# Patient Record
Sex: FEMALE | Race: BLACK OR AFRICAN AMERICAN | URBAN - METROPOLITAN AREA
[De-identification: names, ages, dates, MRNs, and addresses within clinical notes are randomized per-mention and may not be internally consistent; named-entity substitution may affect disease eponyms.]

---

## 2018-03-13 PROBLEM — R09.2 RESPIRATORY ARREST (HCC): Status: ACTIVE | Noted: 2018-03-13

## 2018-04-04 ENCOUNTER — SURG/PROC ORDERS (OUTPATIENT)
Dept: ANESTHESIOLOGY | Age: 53
End: 2018-04-04

## 2018-04-04 RX ORDER — SODIUM CHLORIDE 9 MG/ML
INJECTION, SOLUTION INTRAVENOUS CONTINUOUS
Status: CANCELLED | OUTPATIENT
Start: 2018-04-04

## 2018-04-04 RX ORDER — SODIUM CHLORIDE 0.9 % (FLUSH) 0.9 %
10 SYRINGE (ML) INJECTION PRN
Status: CANCELLED | OUTPATIENT
Start: 2018-04-04

## 2018-04-04 RX ORDER — SODIUM CHLORIDE 0.9 % (FLUSH) 0.9 %
10 SYRINGE (ML) INJECTION EVERY 12 HOURS SCHEDULED
Status: CANCELLED | OUTPATIENT
Start: 2018-04-04

## 2018-04-05 ENCOUNTER — SURG/PROC ORDERS (OUTPATIENT)
Dept: ANESTHESIOLOGY | Age: 53
End: 2018-04-05

## 2018-04-05 ENCOUNTER — PAT TELEPHONE (OUTPATIENT)
Dept: PREADMISSION TESTING | Age: 53
End: 2018-04-05

## 2018-04-05 RX ORDER — ATORVASTATIN CALCIUM 80 MG/1
80 TABLET, FILM COATED ORAL EVERY EVENING
COMMUNITY

## 2018-04-05 RX ORDER — CARVEDILOL 3.12 MG/1
25 TABLET ORAL 2 TIMES DAILY WITH MEALS
Status: ON HOLD | COMMUNITY
End: 2018-06-18 | Stop reason: SDUPTHER

## 2018-04-05 RX ORDER — SODIUM CHLORIDE 0.9 % (FLUSH) 0.9 %
10 SYRINGE (ML) INJECTION PRN
Status: CANCELLED | OUTPATIENT
Start: 2018-04-05

## 2018-04-05 RX ORDER — SODIUM CHLORIDE 9 MG/ML
INJECTION, SOLUTION INTRAVENOUS CONTINUOUS
Status: CANCELLED | OUTPATIENT
Start: 2018-04-05

## 2018-04-05 RX ORDER — MIDODRINE HYDROCHLORIDE 5 MG/1
10 TABLET ORAL 2 TIMES DAILY PRN
COMMUNITY

## 2018-04-05 RX ORDER — FAMOTIDINE 20 MG/1
20 TABLET, FILM COATED ORAL DAILY
COMMUNITY

## 2018-04-05 RX ORDER — HYDRALAZINE HYDROCHLORIDE 25 MG/1
25 TABLET, FILM COATED ORAL EVERY 6 HOURS PRN
COMMUNITY

## 2018-04-05 RX ORDER — IPRATROPIUM BROMIDE 21 UG/1
2 SPRAY, METERED NASAL EVERY 12 HOURS
Status: ON HOLD | COMMUNITY
End: 2018-04-07 | Stop reason: CLARIF

## 2018-04-05 RX ORDER — SODIUM CHLORIDE 0.9 % (FLUSH) 0.9 %
10 SYRINGE (ML) INJECTION EVERY 12 HOURS SCHEDULED
Status: CANCELLED | OUTPATIENT
Start: 2018-04-05

## 2018-04-05 RX ORDER — NICOTINE POLACRILEX 4 MG
15 LOZENGE BUCCAL PRN
Status: ON HOLD | COMMUNITY
End: 2018-06-19 | Stop reason: HOSPADM

## 2018-04-05 RX ORDER — LEVOTHYROXINE SODIUM 0.05 MG/1
50 TABLET ORAL DAILY
COMMUNITY

## 2018-04-05 RX ORDER — LORAZEPAM 1 MG/1
1 TABLET ORAL EVERY 6 HOURS PRN
Status: ON HOLD | COMMUNITY
End: 2018-04-07 | Stop reason: CLARIF

## 2018-04-06 PROBLEM — J96.10 CHRONIC RESPIRATORY FAILURE (HCC): Status: ACTIVE | Noted: 2018-04-06

## 2018-04-07 PROBLEM — D62 ACUTE BLOOD LOSS ANEMIA: Status: ACTIVE | Noted: 2018-04-07

## 2018-04-07 PROBLEM — N18.2 CKD (CHRONIC KIDNEY DISEASE), STAGE II: Status: ACTIVE | Noted: 2018-04-07

## 2018-04-07 PROBLEM — Z79.4 TYPE 2 DIABETES MELLITUS WITH STAGE 2 CHRONIC KIDNEY DISEASE, WITH LONG-TERM CURRENT USE OF INSULIN (HCC): Status: ACTIVE | Noted: 2018-04-07

## 2018-04-07 PROBLEM — I82.403 ACUTE DEEP VEIN THROMBOSIS (DVT) OF BOTH LOWER EXTREMITIES (HCC): Status: ACTIVE | Noted: 2018-04-07

## 2018-04-07 PROBLEM — N18.2 TYPE 2 DIABETES MELLITUS WITH STAGE 2 CHRONIC KIDNEY DISEASE, WITH LONG-TERM CURRENT USE OF INSULIN (HCC): Status: ACTIVE | Noted: 2018-04-07

## 2018-04-07 PROBLEM — E11.22 TYPE 2 DIABETES MELLITUS WITH STAGE 2 CHRONIC KIDNEY DISEASE, WITH LONG-TERM CURRENT USE OF INSULIN (HCC): Status: ACTIVE | Noted: 2018-04-07

## 2018-06-17 PROBLEM — J96.00 RESPIRATORY FAILURE, ACUTE (HCC): Status: ACTIVE | Noted: 2018-06-17

## 2018-06-19 PROBLEM — J96.00 ACUTE RESPIRATORY FAILURE (HCC): Status: ACTIVE | Noted: 2018-06-19

## 2018-08-26 PROBLEM — I82.612 LEFT CEPHALIC VEIN THROMBOSIS: Status: ACTIVE | Noted: 2018-08-26

## 2018-09-03 PROBLEM — I82.612 LEFT CEPHALIC VEIN THROMBOSIS: Chronic | Status: ACTIVE | Noted: 2018-08-26

## 2018-09-03 PROBLEM — E44.0 MODERATE PROTEIN-CALORIE MALNUTRITION (HCC): Status: ACTIVE | Noted: 2018-09-03

## 2018-09-03 PROBLEM — G93.1 ANOXIC BRAIN INJURY (HCC): Status: ACTIVE | Noted: 2018-09-03

## 2018-09-03 PROBLEM — N18.2 TYPE 2 DIABETES MELLITUS WITH STAGE 2 CHRONIC KIDNEY DISEASE, WITH LONG-TERM CURRENT USE OF INSULIN (HCC): Chronic | Status: ACTIVE | Noted: 2018-04-07

## 2018-09-03 PROBLEM — J15.6 PROTEUS MIRABILIS PNEUMONIA (HCC): Status: ACTIVE | Noted: 2018-09-03

## 2018-09-03 PROBLEM — E11.22 TYPE 2 DIABETES MELLITUS WITH STAGE 2 CHRONIC KIDNEY DISEASE, WITH LONG-TERM CURRENT USE OF INSULIN (HCC): Chronic | Status: ACTIVE | Noted: 2018-04-07

## 2018-09-03 PROBLEM — R53.2 FUNCTIONAL QUADRIPLEGIA (HCC): Status: ACTIVE | Noted: 2018-09-03

## 2018-09-03 PROBLEM — Z79.4 TYPE 2 DIABETES MELLITUS WITH STAGE 2 CHRONIC KIDNEY DISEASE, WITH LONG-TERM CURRENT USE OF INSULIN (HCC): Chronic | Status: ACTIVE | Noted: 2018-04-07

## 2018-09-03 PROBLEM — D62 ACUTE BLOOD LOSS ANEMIA: Status: RESOLVED | Noted: 2018-04-07 | Resolved: 2018-09-03

## 2018-09-03 PROBLEM — G93.1 ANOXIC BRAIN INJURY (HCC): Chronic | Status: ACTIVE | Noted: 2018-09-03

## 2018-09-22 ENCOUNTER — HOSPITAL ENCOUNTER (EMERGENCY)
Age: 53
Discharge: NURSING, REHAB OR LONG-TERM CARE | End: 2018-09-22
Payer: COMMERCIAL

## 2018-09-22 DIAGNOSIS — J95.09 TRACHEOSTOMY OBSTRUCTION (HCC): Primary | ICD-10-CM

## 2018-09-22 DIAGNOSIS — R79.89 AZOTEMIA: ICD-10-CM

## 2018-09-22 LAB
A/G RATIO: 0.9 (ref 1.1–2.2)
ALBUMIN SERPL-MCNC: 3.7 G/DL (ref 3.4–5)
ALP BLD-CCNC: 72 U/L (ref 40–129)
ALT SERPL-CCNC: 16 U/L (ref 10–40)
ANION GAP SERPL CALCULATED.3IONS-SCNC: 5 MMOL/L (ref 3–16)
AST SERPL-CCNC: 13 U/L (ref 15–37)
BACTERIA: ABNORMAL /HPF
BASOPHILS ABSOLUTE: 0 K/UL (ref 0–0.2)
BASOPHILS RELATIVE PERCENT: 0.4 %
BILIRUB SERPL-MCNC: 0.3 MG/DL (ref 0–1)
BILIRUBIN URINE: NEGATIVE
BLOOD, URINE: ABNORMAL
BUN BLDV-MCNC: 122 MG/DL (ref 7–20)
CALCIUM SERPL-MCNC: 10.6 MG/DL (ref 8.3–10.6)
CHLORIDE BLD-SCNC: 106 MMOL/L (ref 99–110)
CLARITY: ABNORMAL
CO2: 31 MMOL/L (ref 21–32)
COLOR: YELLOW
COMMENT UA: ABNORMAL
CREAT SERPL-MCNC: 3.4 MG/DL (ref 0.6–1.1)
EOSINOPHILS ABSOLUTE: 0.5 K/UL (ref 0–0.6)
EOSINOPHILS RELATIVE PERCENT: 5.3 %
EPITHELIAL CELLS, UA: ABNORMAL /HPF
GFR AFRICAN AMERICAN: 17
GFR NON-AFRICAN AMERICAN: 14
GLOBULIN: 4.1 G/DL
GLUCOSE BLD-MCNC: 162 MG/DL (ref 70–99)
GLUCOSE URINE: NEGATIVE MG/DL
HCT VFR BLD CALC: 30.9 % (ref 36–48)
HEMOGLOBIN: 9.6 G/DL (ref 12–16)
KETONES, URINE: NEGATIVE MG/DL
LEUKOCYTE ESTERASE, URINE: ABNORMAL
LYMPHOCYTES ABSOLUTE: 1.5 K/UL (ref 1–5.1)
LYMPHOCYTES RELATIVE PERCENT: 17.1 %
MCH RBC QN AUTO: 26.4 PG (ref 26–34)
MCHC RBC AUTO-ENTMCNC: 30.9 G/DL (ref 31–36)
MCV RBC AUTO: 85.3 FL (ref 80–100)
MICROSCOPIC EXAMINATION: YES
MONOCYTES ABSOLUTE: 0.5 K/UL (ref 0–1.3)
MONOCYTES RELATIVE PERCENT: 5.3 %
NEUTROPHILS ABSOLUTE: 6.4 K/UL (ref 1.7–7.7)
NEUTROPHILS RELATIVE PERCENT: 71.9 %
NITRITE, URINE: POSITIVE
PDW BLD-RTO: 16.3 % (ref 12.4–15.4)
PH UA: 6
PLATELET # BLD: 312 K/UL (ref 135–450)
PMV BLD AUTO: 9.7 FL (ref 5–10.5)
POTASSIUM SERPL-SCNC: 4.8 MMOL/L (ref 3.5–5.1)
PRO-BNP: 355 PG/ML (ref 0–124)
PROTEIN UA: 100 MG/DL
RBC # BLD: 3.63 M/UL (ref 4–5.2)
RBC UA: ABNORMAL /HPF (ref 0–2)
SODIUM BLD-SCNC: 142 MMOL/L (ref 136–145)
SPECIFIC GRAVITY UA: 1.02
TOTAL PROTEIN: 7.8 G/DL (ref 6.4–8.2)
TROPONIN: 0.23 NG/ML
URINE REFLEX TO CULTURE: YES
URINE TYPE: ABNORMAL
UROBILINOGEN, URINE: 0.2 E.U./DL
WBC # BLD: 8.9 K/UL (ref 4–11)
WBC UA: ABNORMAL /HPF (ref 0–5)

## 2018-09-22 PROCEDURE — 87077 CULTURE AEROBIC IDENTIFY: CPT

## 2018-09-22 PROCEDURE — 80053 COMPREHEN METABOLIC PANEL: CPT

## 2018-09-22 PROCEDURE — 87040 BLOOD CULTURE FOR BACTERIA: CPT

## 2018-09-22 PROCEDURE — 83880 ASSAY OF NATRIURETIC PEPTIDE: CPT

## 2018-09-22 PROCEDURE — 87186 SC STD MICRODIL/AGAR DIL: CPT

## 2018-09-22 PROCEDURE — 87184 SC STD DISK METHOD PER PLATE: CPT

## 2018-09-22 PROCEDURE — 87086 URINE CULTURE/COLONY COUNT: CPT

## 2018-09-22 PROCEDURE — 36415 COLL VENOUS BLD VENIPUNCTURE: CPT

## 2018-09-22 PROCEDURE — 99284 EMERGENCY DEPT VISIT MOD MDM: CPT

## 2018-09-22 PROCEDURE — 84484 ASSAY OF TROPONIN QUANT: CPT

## 2018-09-22 PROCEDURE — 81001 URINALYSIS AUTO W/SCOPE: CPT

## 2018-09-22 PROCEDURE — 85025 COMPLETE CBC W/AUTO DIFF WBC: CPT

## 2018-09-25 ENCOUNTER — HOSPITAL ENCOUNTER (OUTPATIENT)
Dept: GENERAL RADIOLOGY | Age: 53
Discharge: HOME | End: 2018-09-25
Payer: COMMERCIAL

## 2018-09-25 DIAGNOSIS — J18.9 PNEUMONIA DUE TO INFECTIOUS ORGANISM, UNSPECIFIED LATERALITY, UNSPECIFIED PART OF LUNG: ICD-10-CM

## 2018-09-25 LAB
ORGANISM: ABNORMAL
URINE CULTURE, ROUTINE: ABNORMAL
URINE CULTURE, ROUTINE: ABNORMAL

## 2018-09-25 PROCEDURE — 71045 X-RAY EXAM CHEST 1 VIEW: CPT

## 2018-09-26 NOTE — ED PROVIDER NOTES
Triage Chief Complaint:   No chief complaint on file. Inaja:  Ana Rivera is a 47 y.o. female that presents to the emergency department after being brought in by EMS. Patient was picked up from a local nursing home, where they deal with trach dependent patients. The patient was being sent in secondary to fact that the patient had a blocked PEG tube. EMS, however, when they arrived on scene and found the patient being restricted distress reported this as respiratory distress. The patient is unable to give any accurate history, but seems to be having difficulty breathing, and her tubes seems be actively clogged. Patient had no other history provided, and nursing notes were sparse. ROS:  At least 10 systems reviewed and otherwise acutely negative except as in the [address] Past Medical History:   Diagnosis Date    Arthritis     Asthma     [facility]est[facility]     Chiari malformation     Chronic kidney disease, stage 3     COPD (chronic obstructive pulmonary disease) ([facility]     CVA (cerebral vascular accident) ([facility] [address] 06/22/2017    Diabetes mellitus ([facility] [address]     ESBL (extended spectrum beta-lactamase) producing bacteria infection 09/22/2018    urine    Hyperlipidemia     Hypertension     MDRO (multiple drug resistant organisms) resistance 8/26/18;06/19/2018    sputum    Sleep apnea     Thyroid disease      Past Surgical History:   Procedure Laterality Date    KNEE SURGERY      KNEE SURGERY       No family history on file. Social History     Social History    Marital status: Single     Spouse name: N/A    Number of children: N/A    Years of education: N/A     Occupational History    Not on file.      Social History Main Topics    Smoking status: Never Smoker    Smokeless tobacco: Never Used    Alcohol use Not on file    Drug use: No    Sexual activity: Not on file     Other Topics Concern    Not on file     Social History Narrative    No narrative on file     No current Other Shortness Of Breath     Asthma attacks    Peanut (Diagnostic)      HIVES, SOB  Asthma attacks  HIVES, SOB    Peanut Butter Flavor Shortness Of Breath    Peanut-Containing Drug Products Shortness Of Breath and Other (See Comments)     Asthma attacks    Propoxyphene Nausea And Vomiting and Shortness Of Breath     Pt.vomited, pt experienced chest tightness, coughing, sob & wheezing after given CT contrast on 7/7/13  Pt. vomited, pt experienced chest tightness, coughing, sob & wheezing after given CT contrast on 7/7/13  Pt. vomited, pt experienced chest tightness, coughing, sob & wheezing after given CT contrast on 7/7/13    Ketorolac Tromethamine Itching    Tramadol Hives and Itching    Ketorolac Rash    Ketorolac Tromethamine Itching and Nausea And Vomiting       Nursing Notes Reviewed    Physical Exam:  ED Triage Vitals   BP Temp Temp src Pulse Resp SpO2 Height Weight   -- -- -- -- -- -- -- --     GENERAL APPEARANCE: Awake, In moderate distress. HEAD: Normocephalic. Atraumatic. EYES: EOM's grossly intact. Sclera anicteric. ENT: Mucous membranes are moist. Tolerates saliva. No trismus. NECK: Supple. No meningismus. Trachea midline. Trach site appears clean, however trach itself does appear to be obstructed. HEART: Tachycardic. LUNGS: Respirations unlabored. Rhonchi bilaterally. ABDOMEN: Soft. Non-tender. No guarding or rebound. EXTREMITIES: No acute deformities. SKIN: Warm and dry. NEUROLOGICAL: No gross facial drooping. Moves all 4 extremities spontaneously. Physical Exam    I have reviewed and interpreted all of the currently available lab results from this visit (if applicable):  Results for orders placed or performed during the hospital encounter of 09/22/18   Culture Blood #1   Result Value Ref Range    Blood Culture, Routine       No Growth to date. Any change in status will be called. Culture Blood #2   Result Value Ref Range    Culture, Blood 2       No Growth to date.   Any change in status will be called. Urine Culture   Result Value Ref Range    Urine Culture, Routine      Organism Escherichia coli ESBL (A)     Urine Culture, Routine (A)      >100,000 CFU/ml  CONTACT PRECAUTIONS INDICATED  Carbapenem antibiotics are the best option for infections caused  by ESBL-producing organisms. Other antibiotic classes are  likely to result in treatment failure, even for organisms  demonstrating in vitro susceptibility.          Susceptibility    Escherichia coli (esbl) - BACTERIAL SUSCEPTIBILITY PANEL BY CHARLES     amoxicillin-clavulanate >16/8 Resistant mcg/mL     ampicillin >16 Resistant mcg/mL     ceFAZolin >16 Resistant mcg/mL     cefepime >16 Resistant mcg/mL     cefotaxime >32 Resistant mcg/mL     cefOXitin >16 Resistant mcg/mL     cefTRIAXone >32 Resistant mcg/mL     cefuroxime >16 Resistant mcg/mL     ciprofloxacin >2 Resistant mcg/mL     gentamicin <=4 Sensitive mcg/mL     meropenem <=1 Sensitive mcg/mL     nitrofurantoin <=32 Sensitive mcg/mL     trimethoprim-sulfamethoxazole >2/38 Resistant mcg/mL   CBC Auto Differential   Result Value Ref Range    WBC 8.9 4.0 - 11.0 K/uL    RBC 3.63 (L) 4.00 - 5.20 M/uL    Hemoglobin 9.6 (L) 12.0 - 16.0 g/dL    Hematocrit 30.9 (L) 36.0 - 48.0 %    MCV 85.3 80.0 - 100.0 fL    MCH 26.4 26.0 - 34.0 pg    MCHC 30.9 (L) 31.0 - 36.0 g/dL    RDW 16.3 (H) 12.4 - 15.4 %    Platelets 135 647 - 305 K/uL    MPV 9.7 5.0 - 10.5 fL    Neutrophils % 71.9 %    Lymphocytes % 17.1 %    Monocytes % 5.3 %    Eosinophils % 5.3 %    Basophils % 0.4 %    Neutrophils # 6.4 1.7 - 7.7 K/uL    Lymphocytes # 1.5 1.0 - 5.1 K/uL    Monocytes # 0.5 0.0 - 1.3 K/uL    Eosinophils # 0.5 0.0 - 0.6 K/uL    Basophils # 0.0 0.0 - 0.2 K/uL   Comprehensive Metabolic Panel   Result Value Ref Range    Sodium 142 136 - 145 mmol/L    Potassium 4.8 3.5 - 5.1 mmol/L    Chloride 106 99 - 110 mmol/L    CO2 31 21 - 32 mmol/L    Anion Gap 5 3 - 16    Glucose 162 (H) 70 - 99 mg/dL     (HH) 7 - 20 mg/dL    CREATININE 3.4 (H) 0.6 - 1.1 mg/dL    GFR Non-African American 14 (A) >60    GFR  17 (A) >60    Calcium 10.6 8.3 - 10.6 mg/dL    Total Protein 7.8 6.4 - 8.2 g/dL    Alb 3.7 3.4 - 5.0 g/dL    Albumin/Globulin Ratio 0.9 (L) 1.1 - 2.2    Total Bilirubin 0.3 0.0 - 1.0 mg/dL    Alkaline Phosphatase 72 40 - 129 U/L    ALT 16 10 - 40 U/L    AST 13 (L) 15 - 37 U/L    Globulin 4.1 g/dL   Brain Natriuretic Peptide   Result Value Ref Range    Pro- (H) 0 - 124 pg/mL   Troponin   Result Value Ref Range    Troponin 0.23 (H) <0.01 ng/mL   Urinalysis Reflex to Culture   Result Value Ref Range    Color, UA Yellow Straw/Yellow    Clarity, UA TURBID (A) Clear    Glucose, Ur Negative Negative mg/dL    Bilirubin Urine Negative Negative    Ketones, Urine Negative Negative mg/dL    Specific Gravity, UA 1.020 1.005 - 1.030    Blood, Urine MODERATE (A) Negative    pH, UA 6.0 5.0 - 8.0    Protein,  (A) Negative mg/dL    Urobilinogen, Urine 0.2 <2.0 E.U./dL    Nitrite, Urine POSITIVE (A) Negative    Leukocyte Esterase, Urine MODERATE (A) Negative    Microscopic Examination YES     Urine Reflex to Culture Yes     Urine Type Voided    Microscopic Urinalysis   Result Value Ref Range    WBC, UA 20-50 (A) 0 - 5 /HPF    RBC, UA 5-10 (A) 0 - 2 /HPF    Epi Cells 5-10 /HPF    Bacteria, UA 4+ (A) /HPF    Urinalysis Comments see below           Procedures    MDM:  After my initial evaluation, the patient did have blood work, and a chest x-ray done. We did call respiratory, came down and helped unclog the patient's trach. The patient was resting much more comfortable after that. We did obtain blood work, and the blood work initially said the patient BUN was 74. This was not far off the patient's baseline, but urinalysis does demonstrate 4+ bacteria with positive nitrates. This point I feel the patient was likely has a urinary tract infection. The patient was then started on antibiotics.   The rest of the patient's platelets were normal.  Patient does have a slight elevation in white blood cell count low we felt the patient was stable for discharge back to nursing home. The patient's PEG site was flushed without difficulty. So at this point we don't feel there is any problem with the patient's PEG. The patient was transported back to nursing home, and afterwards we did receive a call from the nursing home questioning as to why we did not change the patient's PEG tube, and at that point we did splint to him that the PEG tube was flushing. We did explain the patient was much more critical since the patient was having difficulty breathing, and they do not seem overly concerned with that. I did explain to the staff there that the patient actually had an elevated , since that was reported off by the lab as being inaccurate. I was informed by the nurse that had angina phone that they would address this with the primary care doctor. Clinical Impression:  1. Tracheostomy obstruction ([address]    2.  Azotemia      (Please note that portions of this note may have been completed with a voice recognition program. Efforts were made to edit the dictations but occasionally words are mis-transcribed.)    MD Ty Srivastava MD  09/26/18 [id number]

## 2018-09-27 LAB
BLOOD CULTURE, ROUTINE: NORMAL
CULTURE, BLOOD 2: NORMAL